# Patient Record
Sex: FEMALE | Race: WHITE
[De-identification: names, ages, dates, MRNs, and addresses within clinical notes are randomized per-mention and may not be internally consistent; named-entity substitution may affect disease eponyms.]

---

## 2019-02-01 ENCOUNTER — HOSPITAL ENCOUNTER (OUTPATIENT)
Dept: HOSPITAL 100 - SDC | Age: 39
Setting detail: OBSERVATION
LOS: 1 days | Discharge: HOME | End: 2019-02-02
Payer: COMMERCIAL

## 2019-02-01 VITALS
TEMPERATURE: 99.3 F | SYSTOLIC BLOOD PRESSURE: 155 MMHG | HEART RATE: 94 BPM | RESPIRATION RATE: 16 BRPM | OXYGEN SATURATION: 95 % | DIASTOLIC BLOOD PRESSURE: 87 MMHG

## 2019-02-01 VITALS
RESPIRATION RATE: 16 BRPM | OXYGEN SATURATION: 95 % | TEMPERATURE: 97.2 F | HEART RATE: 115 BPM | DIASTOLIC BLOOD PRESSURE: 74 MMHG | SYSTOLIC BLOOD PRESSURE: 148 MMHG

## 2019-02-01 VITALS
RESPIRATION RATE: 16 BRPM | SYSTOLIC BLOOD PRESSURE: 137 MMHG | DIASTOLIC BLOOD PRESSURE: 90 MMHG | HEART RATE: 78 BPM | OXYGEN SATURATION: 98 % | TEMPERATURE: 98.6 F

## 2019-02-01 VITALS
SYSTOLIC BLOOD PRESSURE: 141 MMHG | DIASTOLIC BLOOD PRESSURE: 90 MMHG | RESPIRATION RATE: 16 BRPM | HEART RATE: 109 BPM | OXYGEN SATURATION: 96 %

## 2019-02-01 VITALS
HEART RATE: 101 BPM | SYSTOLIC BLOOD PRESSURE: 136 MMHG | TEMPERATURE: 98.4 F | RESPIRATION RATE: 18 BRPM | OXYGEN SATURATION: 93 % | DIASTOLIC BLOOD PRESSURE: 66 MMHG

## 2019-02-01 VITALS
SYSTOLIC BLOOD PRESSURE: 137 MMHG | HEART RATE: 103 BPM | DIASTOLIC BLOOD PRESSURE: 90 MMHG | RESPIRATION RATE: 16 BRPM | OXYGEN SATURATION: 94 %

## 2019-02-01 VITALS
DIASTOLIC BLOOD PRESSURE: 87 MMHG | OXYGEN SATURATION: 98 % | SYSTOLIC BLOOD PRESSURE: 154 MMHG | RESPIRATION RATE: 18 BRPM | HEART RATE: 98 BPM

## 2019-02-01 VITALS
OXYGEN SATURATION: 96 % | SYSTOLIC BLOOD PRESSURE: 126 MMHG | RESPIRATION RATE: 16 BRPM | DIASTOLIC BLOOD PRESSURE: 71 MMHG | HEART RATE: 95 BPM | TEMPERATURE: 98.5 F

## 2019-02-01 VITALS
SYSTOLIC BLOOD PRESSURE: 154 MMHG | HEART RATE: 93 BPM | TEMPERATURE: 98.6 F | DIASTOLIC BLOOD PRESSURE: 80 MMHG | RESPIRATION RATE: 18 BRPM | OXYGEN SATURATION: 94 %

## 2019-02-01 VITALS
RESPIRATION RATE: 16 BRPM | DIASTOLIC BLOOD PRESSURE: 86 MMHG | OXYGEN SATURATION: 98 % | SYSTOLIC BLOOD PRESSURE: 137 MMHG | HEART RATE: 96 BPM

## 2019-02-01 VITALS
DIASTOLIC BLOOD PRESSURE: 90 MMHG | HEART RATE: 105 BPM | SYSTOLIC BLOOD PRESSURE: 137 MMHG | OXYGEN SATURATION: 94 % | RESPIRATION RATE: 16 BRPM

## 2019-02-01 VITALS — HEART RATE: 95 BPM

## 2019-02-01 VITALS — BODY MASS INDEX: 48.4 KG/M2

## 2019-02-01 DIAGNOSIS — Z86.2: ICD-10-CM

## 2019-02-01 DIAGNOSIS — E04.2: Primary | ICD-10-CM

## 2019-02-01 LAB
ANION GAP: 6 (ref 5–15)
BUN SERPL-MCNC: 10 MG/DL (ref 7–18)
BUN/CREAT RATIO: 11.9 RATIO (ref 10–20)
CALCIUM SERPL-MCNC: 8.5 MG/DL (ref 8.5–10.1)
CALCIUM SERPL-MCNC: 8.6 MG/DL (ref 8.5–10.1)
CARBON DIOXIDE: 24 MMOL/L (ref 21–32)
CHLORIDE: 109 MMOL/L (ref 98–107)
DEPRECATED RDW RBC: 47.3 FL (ref 35.1–43.9)
DIFFERENTIAL INDICATED: (no result)
ERYTHROCYTE [DISTWIDTH] IN BLOOD: 14.9 % (ref 11.6–14.6)
EST GLOM FILT RATE - AFR AMER: 98 ML/MIN (ref 60–?)
ESTIMATED CREATININE CLEARANCE: 75.12 ML/MIN
GLUCOSE: 82 MG/DL (ref 74–106)
HCT VFR BLD AUTO: 38.3 % (ref 37–47)
HEMOGLOBIN: 12.1 G/DL (ref 12–15)
HGB BLD-MCNC: 12.1 G/DL (ref 12–15)
IMMATURE GRANULOCYTES COUNT: 0.01 X10^3/UL (ref 0–0)
INTERNAL QC VALIDATED?: (no result)
MCV RBC: 87 FL (ref 81–99)
MEAN CORP HGB CONC: 31.6 G/GL (ref 32–36)
MEAN PLATELET VOL.: 10.3 FL (ref 6.2–12)
PLATELET # BLD: 295 K/MM3 (ref 150–450)
PLATELET COUNT: 295 K/MM3 (ref 150–450)
POSITIVE COUNT: NO
POSITIVE DIFFERENTIAL: NO
POSITIVE MORPHOLOGY: NO
POTASSIUM: 3.8 MMOL/L (ref 3.5–5.1)
RBC # BLD AUTO: 4.4 M/MM3 (ref 4.2–5.4)
RBC DISTRIBUTION WIDTH CV: 14.9 % (ref 11.6–14.6)
RBC DISTRIBUTION WIDTH SD: 47.3 FL (ref 35.1–43.9)
WBC # BLD AUTO: 7.5 K/MM3 (ref 4.4–11)
WHITE BLOOD COUNT: 7.5 K/MM3 (ref 4.4–11)

## 2019-02-01 PROCEDURE — 88341 IMHCHEM/IMCYTCHM EA ADD ANTB: CPT

## 2019-02-01 PROCEDURE — 82310 ASSAY OF CALCIUM: CPT

## 2019-02-01 PROCEDURE — 81025 URINE PREGNANCY TEST: CPT

## 2019-02-01 PROCEDURE — 85025 COMPLETE CBC W/AUTO DIFF WBC: CPT

## 2019-02-01 PROCEDURE — 00320 ANES ALL PX NECK NOS 1YR/>: CPT

## 2019-02-01 PROCEDURE — G0379 DIRECT REFER HOSPITAL OBSERV: HCPCS

## 2019-02-01 PROCEDURE — 36415 COLL VENOUS BLD VENIPUNCTURE: CPT

## 2019-02-01 PROCEDURE — 88342 IMHCHEM/IMCYTCHM 1ST ANTB: CPT

## 2019-02-01 PROCEDURE — 88307 TISSUE EXAM BY PATHOLOGIST: CPT

## 2019-02-01 PROCEDURE — 80048 BASIC METABOLIC PNL TOTAL CA: CPT

## 2019-02-01 PROCEDURE — 88331 PATH CONSLTJ SURG 1 BLK 1SPC: CPT

## 2019-02-01 PROCEDURE — 99218: CPT

## 2019-02-01 PROCEDURE — 60240 REMOVAL OF THYROID: CPT

## 2019-02-01 PROCEDURE — G0378 HOSPITAL OBSERVATION PER HR: HCPCS

## 2019-02-02 VITALS
RESPIRATION RATE: 18 BRPM | DIASTOLIC BLOOD PRESSURE: 62 MMHG | OXYGEN SATURATION: 97 % | HEART RATE: 60 BPM | TEMPERATURE: 98.6 F | SYSTOLIC BLOOD PRESSURE: 127 MMHG

## 2019-02-02 VITALS
TEMPERATURE: 98.8 F | HEART RATE: 69 BPM | DIASTOLIC BLOOD PRESSURE: 66 MMHG | OXYGEN SATURATION: 97 % | RESPIRATION RATE: 16 BRPM | SYSTOLIC BLOOD PRESSURE: 118 MMHG

## 2019-02-02 LAB — CALCIUM SERPL-MCNC: 8.5 MG/DL (ref 8.5–10.1)

## 2022-02-01 ENCOUNTER — OFFICE VISIT (OUTPATIENT)
Dept: PRIMARY CARE CLINIC | Age: 42
End: 2022-02-01
Payer: COMMERCIAL

## 2022-02-01 VITALS
RESPIRATION RATE: 18 BRPM | HEART RATE: 89 BPM | SYSTOLIC BLOOD PRESSURE: 129 MMHG | TEMPERATURE: 98 F | BODY MASS INDEX: 51.91 KG/M2 | DIASTOLIC BLOOD PRESSURE: 89 MMHG | OXYGEN SATURATION: 96 % | HEIGHT: 63 IN | WEIGHT: 293 LBS

## 2022-02-01 DIAGNOSIS — J01.40 ACUTE NON-RECURRENT PANSINUSITIS: Primary | ICD-10-CM

## 2022-02-01 PROBLEM — E66.01 MORBID OBESITY WITH BODY MASS INDEX OF 50 OR HIGHER (HCC): Status: ACTIVE | Noted: 2018-04-25

## 2022-02-01 PROBLEM — D64.9 ANEMIA: Status: ACTIVE | Noted: 2022-02-01

## 2022-02-01 PROBLEM — E89.0 POSTOPERATIVE HYPOTHYROIDISM: Status: ACTIVE | Noted: 2020-04-22

## 2022-02-01 PROBLEM — K90.9 IRON MALABSORPTION: Status: ACTIVE | Noted: 2017-01-10

## 2022-02-01 PROBLEM — D69.1 PLATELET STORAGE POOL DEFICIENCY (HCC): Status: ACTIVE | Noted: 2017-01-17

## 2022-02-01 PROBLEM — E88.81 INSULIN RESISTANCE: Status: ACTIVE | Noted: 2021-12-07

## 2022-02-01 PROBLEM — D50.0 IRON DEFICIENCY ANEMIA DUE TO CHRONIC BLOOD LOSS: Status: ACTIVE | Noted: 2017-01-03

## 2022-02-01 PROCEDURE — 99202 OFFICE O/P NEW SF 15 MIN: CPT | Performed by: NURSE PRACTITIONER

## 2022-02-01 RX ORDER — LEVOTHYROXINE SODIUM 0.12 MG/1
125 TABLET ORAL EVERY OTHER DAY
COMMUNITY
Start: 2021-12-07

## 2022-02-01 RX ORDER — AMOXICILLIN AND CLAVULANATE POTASSIUM 875; 125 MG/1; MG/1
1 TABLET, FILM COATED ORAL 2 TIMES DAILY
Qty: 20 TABLET | Refills: 0 | Status: SHIPPED | OUTPATIENT
Start: 2022-02-01 | End: 2022-02-11

## 2022-02-01 RX ORDER — LIOTHYRONINE SODIUM 5 UG/1
10 TABLET ORAL DAILY
COMMUNITY
Start: 2021-12-07

## 2022-02-01 RX ORDER — LEVOTHYROXINE SODIUM 137 UG/1
137 TABLET ORAL EVERY OTHER DAY
COMMUNITY
Start: 2021-12-07

## 2022-02-01 ASSESSMENT — ENCOUNTER SYMPTOMS
DIARRHEA: 0
RHINORRHEA: 1
COUGH: 1
VOMITING: 0
NAUSEA: 0
SINUS PRESSURE: 1
SORE THROAT: 0
SHORTNESS OF BREATH: 0
SINUS PAIN: 1
WHEEZING: 0

## 2022-02-01 NOTE — PROGRESS NOTES
[facility]  [facility] [address]  Dept: [phone]  Dept Fax: [phone]     Geno Banda is a 39 y.o. female who presents to the [facility] today for hermedical conditions/complaints as noted below. Geno Banda is c/o of Post-COVID Symptoms (cough, congestion, drainage, b/l ear pressure. )      HPI:     Cough  This is a new problem. The current episode started 1 to 4 weeks ago (Had Covid-19 two weeks ago and has continued with cough, congestion, clear runny nose and B/L ear pressure. History of ear infections. Deaf in right ear. Tubes as kid.). The problem has been gradually worsening. The problem occurs every few minutes. The cough is productive of sputum. Associated symptoms include nasal congestion, postnasal drip and rhinorrhea. Pertinent negatives include no chills, ear pain, fever, headaches, myalgias, rash, sore throat, shortness of breath or wheezing. Associated symptoms comments: B/L ear pressure and sinus pressure with headache. . Exacerbated by: coughing. She has tried OTC cough suppressant (Mucinex) for the symptoms. The treatment provided mild relief. Her past medical history is significant for bronchitis and environmental allergies. There is no history of asthma or pneumonia. History reviewed. No pertinent past medical history.      Current Outpatient Medications   Medication Sig Dispense Refill    liothyronine (CYTOMEL) 5 MCG tablet Take 10 mcg by mouth daily      levothyroxine (SYNTHROID) 125 MCG tablet Take 125 mcg by mouth every other day      levothyroxine (SYNTHROID) 137 MCG tablet Take 137 mcg by mouth every other day      amoxicillin-clavulanate (AUGMENTIN) 875-125 MG per tablet Take 1 tablet by mouth 2 times daily for 10 days 20 tablet 0    Pseudoephedrine-DM-GG 60- MG TABS Take 1 tablet by mouth 4 times daily as needed (For cough, congestion and/or sinus pain/pressure) 28 tablet 0     No current facility-administered medications for this visit. No Known Allergies    Subjective:     Review of Systems   Constitutional: Positive for fatigue. Negative for appetite change, chills, diaphoresis and fever. HENT: Positive for congestion, postnasal drip, rhinorrhea, sinus pressure and sinus pain. Negative for ear pain and sore throat. Respiratory: Positive for cough. Negative for shortness of breath and wheezing. Gastrointestinal: Negative for diarrhea, nausea and vomiting. Musculoskeletal: Negative for myalgias. Skin: Negative for rash and wound. Allergic/Immunologic: Positive for environmental allergies. Neurological: Positive for dizziness. Negative for headaches. Objective:      Physical Exam  Vitals and nursing note reviewed. Constitutional:       General: She is not in acute distress. Appearance: Normal appearance. She is well-developed. She is not ill-appearing or diaphoretic. Comments: Well hydrated, nontoxic appearance. HENT:      Head: Normocephalic and atraumatic. Right Ear: Hearing, ear canal and external ear normal. No drainage. A middle ear effusion (Opaque white fluid.) is present. No mastoid tenderness. Tympanic membrane is not injected, erythematous or bulging. Left Ear: Hearing, ear canal and external ear normal. No drainage. A middle ear effusion (Opaque white fluid.) is present. No mastoid tenderness. Tympanic membrane is scarred and bulging. Tympanic membrane is not injected or erythematous. Nose: Mucosal edema, congestion and rhinorrhea present. Rhinorrhea is clear. Right Sinus: Maxillary sinus tenderness and frontal sinus tenderness present. Left Sinus: Maxillary sinus tenderness and frontal sinus tenderness present. Mouth/Throat:      Lips: Pink. Mouth: Mucous membranes are moist.      Pharynx: Uvula midline.  Oropharyngeal exudate (Large amount of thick yellow secretions to posterior pharynx.) and posterior oropharyngeal erythema (Slight erythema to posterior pharynx.) present. No pharyngeal swelling or uvula swelling. Eyes:      General: No scleral icterus. Right eye: No discharge. Left eye: No discharge. Conjunctiva/sclera: Conjunctivae normal.      Pupils: Pupils are equal, round, and reactive to light. Cardiovascular:      Rate and Rhythm: Normal rate and regular rhythm. Heart sounds: Normal heart sounds, S1 normal and S2 normal. No murmur heard. No friction rub. No gallop. Pulmonary:      Effort: Pulmonary effort is normal. No accessory muscle usage or respiratory distress. Breath sounds: Normal breath sounds and air entry. No decreased breath sounds, wheezing, rhonchi or rales. Comments: Occasional harsh, moist cough. Breath sounds clear B/L anterior and posterior lobes. Chest expansion symmetrical.  No audible wheezing or respiratory distress. No rales or rhonchi. Abdominal:      General: Bowel sounds are normal.      Palpations: Abdomen is soft. Tenderness: There is no abdominal tenderness. Musculoskeletal:         General: Normal range of motion. Lymphadenopathy:      Cervical: Cervical adenopathy present. Right cervical: Superficial cervical adenopathy present. No posterior cervical adenopathy. Left cervical: Superficial cervical adenopathy present. No posterior cervical adenopathy. Skin:     General: Skin is warm and dry. Coloration: Skin is not pale. Findings: No erythema or rash. Neurological:      Mental Status: She is alert and oriented to person, place, and time. Psychiatric:         Behavior: Behavior normal. Behavior is cooperative. /89 (Site: Right Upper Arm, Position: Sitting, Cuff Size: Large Adult)   Pulse 89   Temp 98 °F (36.7 °C) (Oral)   Resp 18   Ht 5' 3\" (1.6 m)   Wt 297 lb (134.7 kg)   SpO2 96%   BMI 52.61 kg/m²     Assessment:      Diagnosis Orders   1.  Acute non-recurrent pansinusitis  amoxicillin-clavulanate (AUGMENTIN) 875-125 MG per tablet    Pseudoephedrine-DM-GG 60- MG TABS       Plan:      Return if symptoms worsen or fail to improve, for Resume all previous medications as directed. Orders Placed This Encounter   Medications    amoxicillin-clavulanate (AUGMENTIN) 875-125 MG per tablet     Sig: Take 1 tablet by mouth 2 times daily for 10 days     Dispense:  20 tablet     Refill:  0    Pseudoephedrine-DM-GG 60- MG TABS     Sig: Take 1 tablet by mouth 4 times daily as needed (For cough, congestion and/or sinus pain/pressure)     Dispense:  28 tablet     Refill:  0      · Encouraged to increase fluids and rest  · Continue antibiotic as prescribed until all doses are completed - take with food  · Probiotic OTC or greek yogurt daily while on antibiotic  · Capmist DM as prescribed as needed for cough, congestion and sinus pain/pressure. · Nasal saline spray OTC every couple of hours for nasal congestion  · Fluticasone nasal spray, 1 spray each nostril, twice a day for 7 to 10 days  · Warm facial packs applied to face for 5 to 10 minutes, 3 times per day  · Aleve/Ibuprofen/Tylenol OTC PRN for pain, discomfort or fever  · Patient instructions given for acute sinusitis and augmentin. · To ER or call 911 if any difficulty breathing, shortness of breath, inability to swallow, hives, rash, facial/tongue swelling or temp greater than 103 degrees. · Follow up as needed with PCP if symptoms worsen or do not improve     Shelbi Harrison received counseling on the following healthy behaviors: increased fluids and rest.  Patient given educational materials - see patient instructions. Discussed use,benefit, and side effects of prescribed medications. Treatment plan discussed at visit. Continue routine health care follow up. All patient questions answered. Pt voiced understanding.       Electronically signed by JAYLENE Velasquez CNP on 2/1/2022 at 4:17 PM

## 2022-02-01 NOTE — PATIENT INSTRUCTIONS
Patient Education        Patient Education        Sinusitis: Care Instructions  Your Care Instructions     Sinusitis is an infection of the lining of the sinus cavities in your head. Sinusitis often follows a cold. It causes pain and pressure in your head and face. In most cases, sinusitis gets better on its own in 1 to 2 weeks. But some mild symptoms may last for several weeks. Sometimes antibiotics are needed. Follow-up care is a key part of your treatment and safety. Be sure to make and go to all appointments, and call your doctor if you are having problems. It's also a good idea to know your test results and keep a list of the medicines you take. How can you care for yourself at home? · Take an over-the-counter pain medicine, such as acetaminophen (Tylenol), ibuprofen (Advil, Motrin), or naproxen (Aleve). Read and follow all instructions on the label. · If the doctor prescribed antibiotics, take them as directed. Do not stop taking them just because you feel better. You need to take the full course of antibiotics. · Be careful when taking over-the-counter cold or flu medicines and Tylenol at the same time. Many of these medicines have acetaminophen, which is Tylenol. Read the labels to make sure that you are not taking more than the recommended dose. Too much acetaminophen (Tylenol) can be harmful. · Breathe warm, moist air from a steamy shower, a hot bath, or a sink filled with hot water. Avoid cold, dry air. Using a humidifier in your home may help. Follow the directions for cleaning the machine. · Use saline (saltwater) nasal washes. This can help keep your nasal passages open and wash out mucus and bacteria. You can buy saline nose drops at a grocery store or drugstore. Or you can make your own at home by adding 1 teaspoon of salt and 1 teaspoon of baking soda to 2 cups of distilled water.  If you make your own, fill a bulb syringe with the solution, insert the tip into your nostril, and squeeze gently. Tam Nay your nose. · Put a hot, wet towel or a warm gel pack on your face 3 or 4 times a day for 5 to 10 minutes each time. · Try a decongestant nasal spray like oxymetazoline (Afrin). Do not use it for more than 3 days in a row. Using it for more than 3 days can make your congestion worse. When should you call for help? Call your doctor now or seek immediate medical care if:    · You have new or worse swelling or redness in your face or around your eyes.     · You have a new or higher fever. Watch closely for changes in your health, and be sure to contact your doctor if:    · You have new or worse facial pain.     · The mucus from your nose becomes thicker (like pus) or has new blood in it.     · You are not getting better as expected. Where can you learn more? Go to https://Lecturio[employer]CARDFREE.Secoo. org and sign in to your [employer] account. Enter Y044 in the [employer] box to learn more about \"Sinusitis: Care Instructions. \"     If you do not have an account, please click on the \"Sign Up Now\" link. Current as of: September 8, 2021               Content Version: 13.1  © 2006-2021 [employer]wise, [employer]. Care instructions adapted under license by [facility] ([facility]). If you have questions about a medical condition or this instruction, always ask your healthcare professional. [place] [address] any warranty or liability for your use of this information. · Encouraged to increase fluids and rest  · Continue antibiotic as prescribed until all doses are completed - take with food  · Probiotic OTC or greek yogurt daily while on antibiotic  · Capmist DM as prescribed as needed for cough, congestion and sinus pain/pressure.   · Nasal saline spray OTC every couple of hours for nasal congestion  · Fluticasone nasal spray, 1 spray each nostril, twice a day for 7 to 10 days  · Warm facial packs applied to face for 5 to 10 minutes, 3 times per day  · Aleve/Ibuprofen/Tylenol OTC PRN for pain, discomfort or fever  · Patient instructions given for acute sinusitis and augmentin. · To ER or call 911 if any difficulty breathing, shortness of breath, inability to swallow, hives, rash, facial/tongue swelling or temp greater than 103 degrees.   · Follow up as needed with PCP if symptoms worsen or do not improve

## 2022-12-20 ENCOUNTER — OFFICE VISIT (OUTPATIENT)
Dept: PRIMARY CARE CLINIC | Age: 42
End: 2022-12-20
Payer: COMMERCIAL

## 2022-12-20 VITALS
SYSTOLIC BLOOD PRESSURE: 117 MMHG | RESPIRATION RATE: 18 BRPM | BODY MASS INDEX: 51.91 KG/M2 | HEIGHT: 63 IN | DIASTOLIC BLOOD PRESSURE: 77 MMHG | TEMPERATURE: 99.9 F | WEIGHT: 293 LBS | OXYGEN SATURATION: 99 % | HEART RATE: 108 BPM

## 2022-12-20 DIAGNOSIS — U07.1 COVID-19 VIRUS INFECTION: Primary | ICD-10-CM

## 2022-12-20 DIAGNOSIS — R68.89 FLU-LIKE SYMPTOMS: ICD-10-CM

## 2022-12-20 LAB
INFLUENZA A ANTIBODY: NORMAL
INFLUENZA B ANTIBODY: NORMAL
KIT LOT NO., HCLOLOT: ABNORMAL
SARS-COV-2, POC: DETECTED
VALID INTERNAL CONTROL, POC: PRESENT
VENDOR AND KIT NAME POC: ABNORMAL

## 2022-12-20 PROCEDURE — 87804 INFLUENZA ASSAY W/OPTIC: CPT | Performed by: NURSE PRACTITIONER

## 2022-12-20 PROCEDURE — 99213 OFFICE O/P EST LOW 20 MIN: CPT | Performed by: NURSE PRACTITIONER

## 2022-12-20 RX ORDER — MULTIVITAMIN,THERAPEUTIC
1 TABLET ORAL DAILY
COMMUNITY

## 2022-12-20 RX ORDER — SEMAGLUTIDE 1.34 MG/ML
INJECTION, SOLUTION SUBCUTANEOUS
COMMUNITY
Start: 2022-12-12

## 2022-12-20 RX ORDER — DEXTROMETHORPHAN HYDROBROMIDE, GUAIFENESIN AND PSEUDOEPHEDRINE HYDROCHLORIDE 15; 400; 60 MG/1; MG/1; MG/1
TABLET ORAL
Qty: 28 TABLET | Refills: 0 | Status: SHIPPED | OUTPATIENT
Start: 2022-12-20

## 2022-12-20 ASSESSMENT — ENCOUNTER SYMPTOMS
COUGH: 1
RHINORRHEA: 1

## 2022-12-20 NOTE — PROGRESS NOTES
dry, without visible rash. Neurological:  Alert and oriented. Motor functions intact. Lab / Imaging Results   (All laboratory and radiology results have been personally reviewed by myself)  Labs:  No results found for this visit on 12/20/22. Assessment / Plan     Impression(s):  Rohit Johnson was seen today for uri. Diagnoses and all orders for this visit:    COVID-19 virus infection  -     Pseudoephedrine-DM-GG (CAPMIST DM) 60- MG TABS; 1 capsule every 6 hours by mouth as needed for cough and congestion. Do not exceed 4 capsules per day. Flu-like symptoms  -     POCT Influenza A/B  -     POC COVID-19    -Well-appearing, well-hydrated with no acute distress adult female, COVID-19 positive today. -Treating today with Capmist DM to help with symptoms;  administration and side effects discussed. - Symptomatic treatment discussed including over-the-counter acetaminophen as labeled as needed for pain/fever. Good oral hydration. Diet as tolerated. - Follow-up with PCP if no improvement. ED for any worsening or concern. - Encouraged 5-day quarantine. Work excuse provided.       JAYLENE Villela - CNP

## 2022-12-20 NOTE — PATIENT INSTRUCTIONS
SURVEY:    You may be receiving a survey from [employer] regarding your visit today. Please complete the survey to enable us to provide the highest quality of care to you and your family. If you cannot score us a very good on any question, please call the office to discuss how we could of made your experience a very good one. Thank you for letting us take care of you today. We hope all your questions were addressed. If a question was overlooked or something else comes to mind after you return home, please contact a member of your Care Team listed below.     Thank you,  Norma Haro MA      Your Care Team at [address] [facility]  Provider- ROBERT Mccain  Provider- ROBERT Rosario  [address] [address]  [place]- [place], [place]      Walk-in contact numbers:       Phone: [phone]                 Fax: [phone]    Enio Guaman Hours:  Mon-Thurs: 9:00 am - 5:30 pm     Friday: 8:00 am - 12:00 pm           Sat-Sun: CLOSED

## 2023-07-19 ENCOUNTER — APPOINTMENT (OUTPATIENT)
Dept: URBAN - METROPOLITAN AREA CLINIC 204 | Age: 43
Setting detail: DERMATOLOGY
End: 2023-07-20

## 2023-07-19 PROCEDURE — OTHER MIPS QUALITY: OTHER

## 2023-07-19 PROCEDURE — 99213 OFFICE O/P EST LOW 20 MIN: CPT

## 2023-08-15 ENCOUNTER — APPOINTMENT (OUTPATIENT)
Dept: URBAN - METROPOLITAN AREA CLINIC 204 | Age: 43
Setting detail: DERMATOLOGY
End: 2023-08-16

## 2023-08-15 PROCEDURE — OTHER MIPS QUALITY: OTHER

## 2023-08-15 PROCEDURE — 17999 UNLISTD PX SKN MUC MEMB SUBQ: CPT

## 2024-07-22 ENCOUNTER — APPOINTMENT (OUTPATIENT)
Dept: URBAN - METROPOLITAN AREA CLINIC 204 | Age: 44
Setting detail: DERMATOLOGY
End: 2024-07-23

## 2024-07-22 PROCEDURE — 99213 OFFICE O/P EST LOW 20 MIN: CPT

## 2024-07-22 PROCEDURE — OTHER MIPS QUALITY: OTHER

## 2024-07-22 NOTE — PROCEDURE: MIPS QUALITY
Quality 226: Preventive Care And Screening: Tobacco Use: Screening And Cessation Intervention: Patient screened for tobacco use and is an ex/non-smoker
Additional Notes: Documentation for MIPS  purposes only. Full Patient visit note from paper chart is available for review and also scanned in EMA chart.
Detail Level: Detailed
Quality 47: Advance Care Plan: Advance Care Planning discussed and documented in the medical record; patient did not wish or was not able to name a surrogate decision maker or provide an advance care plan.